# Patient Record
Sex: FEMALE | Race: WHITE
[De-identification: names, ages, dates, MRNs, and addresses within clinical notes are randomized per-mention and may not be internally consistent; named-entity substitution may affect disease eponyms.]

---

## 2018-05-09 ENCOUNTER — HOSPITAL ENCOUNTER (INPATIENT)
Dept: HOSPITAL 25 - ED | Age: 38
LOS: 1 days | Discharge: HOME | DRG: 864 | End: 2018-05-10
Attending: HOSPITALIST | Admitting: HOSPITALIST
Payer: COMMERCIAL

## 2018-05-09 DIAGNOSIS — N39.0: ICD-10-CM

## 2018-05-09 DIAGNOSIS — K50.90: ICD-10-CM

## 2018-05-09 DIAGNOSIS — R50.83: Primary | ICD-10-CM
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
## 2018-05-09 DIAGNOSIS — Z72.89: ICD-10-CM

## 2018-05-09 DIAGNOSIS — G44.40: ICD-10-CM

## 2018-05-09 DIAGNOSIS — L27.0: ICD-10-CM

## 2018-05-09 DIAGNOSIS — J30.2: ICD-10-CM

## 2018-05-09 DIAGNOSIS — M43.6: ICD-10-CM

## 2018-05-09 DIAGNOSIS — Z90.49: ICD-10-CM

## 2018-05-09 DIAGNOSIS — T50.Z95A: ICD-10-CM

## 2018-05-09 DIAGNOSIS — Z88.0: ICD-10-CM

## 2018-05-09 DIAGNOSIS — Z93.2: ICD-10-CM

## 2018-05-09 LAB
BASOPHILS # BLD AUTO: 0.1 10^3/UL (ref 0–0.2)
EOSINOPHIL # BLD AUTO: 0 10^3/UL (ref 0–0.6)
HCT VFR BLD AUTO: 37 % (ref 35–47)
HGB BLD-MCNC: 12.3 G/DL (ref 12–16)
INR PPP/BLD: 1.37 (ref 0.77–1.02)
LYMPHOCYTES # BLD AUTO: 1.4 10^3/UL (ref 1–4.8)
MCH RBC QN AUTO: 29 PG (ref 27–31)
MCHC RBC AUTO-ENTMCNC: 34 G/DL (ref 31–36)
MCV RBC AUTO: 86 FL (ref 80–97)
MONOCYTES # BLD AUTO: 1.2 10^3/UL (ref 0–0.8)
NEUTROPHILS # BLD AUTO: 16.5 10^3/UL (ref 1.5–7.7)
NRBC # BLD AUTO: 0 10^3/UL
NRBC BLD QL AUTO: 0.1
PLATELET # BLD AUTO: 269 10^3/UL (ref 150–450)
RBC # BLD AUTO: 4.24 10^6/UL (ref 4–5.4)
WBC # BLD AUTO: 19.2 10^3/UL (ref 3.5–10.8)

## 2018-05-09 PROCEDURE — 99284 EMERGENCY DEPT VISIT MOD MDM: CPT

## 2018-05-09 PROCEDURE — 87186 SC STD MICRODIL/AGAR DIL: CPT

## 2018-05-09 PROCEDURE — 81015 MICROSCOPIC EXAM OF URINE: CPT

## 2018-05-09 PROCEDURE — 86140 C-REACTIVE PROTEIN: CPT

## 2018-05-09 PROCEDURE — 80053 COMPREHEN METABOLIC PANEL: CPT

## 2018-05-09 PROCEDURE — 009U3ZX DRAINAGE OF SPINAL CANAL, PERCUTANEOUS APPROACH, DIAGNOSTIC: ICD-10-PCS | Performed by: HOSPITALIST

## 2018-05-09 PROCEDURE — 87070 CULTURE OTHR SPECIMN AEROBIC: CPT

## 2018-05-09 PROCEDURE — 82945 GLUCOSE OTHER FLUID: CPT

## 2018-05-09 PROCEDURE — 84157 ASSAY OF PROTEIN OTHER: CPT

## 2018-05-09 PROCEDURE — 83735 ASSAY OF MAGNESIUM: CPT

## 2018-05-09 PROCEDURE — 36415 COLL VENOUS BLD VENIPUNCTURE: CPT

## 2018-05-09 PROCEDURE — 84484 ASSAY OF TROPONIN QUANT: CPT

## 2018-05-09 PROCEDURE — 85652 RBC SED RATE AUTOMATED: CPT

## 2018-05-09 PROCEDURE — 87077 CULTURE AEROBIC IDENTIFY: CPT

## 2018-05-09 PROCEDURE — 84702 CHORIONIC GONADOTROPIN TEST: CPT

## 2018-05-09 PROCEDURE — 89051 BODY FLUID CELL COUNT: CPT

## 2018-05-09 PROCEDURE — 87205 SMEAR GRAM STAIN: CPT

## 2018-05-09 PROCEDURE — 71045 X-RAY EXAM CHEST 1 VIEW: CPT

## 2018-05-09 PROCEDURE — 87086 URINE CULTURE/COLONY COUNT: CPT

## 2018-05-09 PROCEDURE — 87040 BLOOD CULTURE FOR BACTERIA: CPT

## 2018-05-09 PROCEDURE — 85025 COMPLETE CBC W/AUTO DIFF WBC: CPT

## 2018-05-09 PROCEDURE — 80048 BASIC METABOLIC PNL TOTAL CA: CPT

## 2018-05-09 PROCEDURE — 83605 ASSAY OF LACTIC ACID: CPT

## 2018-05-09 PROCEDURE — 85610 PROTHROMBIN TIME: CPT

## 2018-05-09 PROCEDURE — 81003 URINALYSIS AUTO W/O SCOPE: CPT

## 2018-05-09 RX ADMIN — SODIUM CHLORIDE ONE MLS/HR: 900 IRRIGANT IRRIGATION at 18:23

## 2018-05-09 NOTE — XMS REPORT
Heide Romero

 Created on:May 7, 2018



Patient:Heide Romero

Sex:Female

:1980

External Reference #:[id number]





Demographics







 Address  [address]



   [place] [address]

 

 Home Phone  [phone]

 

 Email Address  [email or web address]

 

 Preferred Language  English

 

 Marital Status  Not  Or 

 

 [employer] Affiliation  Unknown

 

 Race  White

 

 Ethnic Group  Not  Or 









Author







 Organization  [facility] [employer]

 

 Address  [address] [address]

 

 Phone  [phone]









Support







 Name  Relationship  Address  Phone

 

 Crescencio Acosta  Unavailable  Unavailable  [phone]









Care Team Providers







 Name  Role  Phone

 

 Ira Araujo MD  Primary Care Physician  Unavailable









Payers







 Type  Date  Identification Numbers  Payment Provider  Subscriber

 

 Commercial  Effective:  Policy Number: [id number]  [employer]  Heide Romero



   2011      









 PayID: [id number]  [id number]









 [place] [address]









 Medigap Part B  Expires:  Policy Number:  [employer]  Heide Romero



   2011  [id number]    









 PayID: [id number]  [id number]









 [place] [address]







Problems







 Date  Description  Provider  Status

 

 Onset: 2012  Contact dermatitis  Lalitha Kingston M.D.  Active

 

 Onset: 2017  Ileostomy present  Ira Araujo M.D.  Active

 

 Onset: 2017  Crohn's disease  Ira Araujo M.D.  Active

 

 Onset: 2017  Human papilloma virus infection  Ira Araujo M.D.  
Resolved









 Resolved: 2018







Social History







 Type  Date  Description  Comments

 

 Smoking    Patient has never smoked  

 

 General Hx Text       Cornwll Un prof (history ) cig



       no etoh 1-2 x per week  no illegal



       drugs exercise walk caffiene 1 cup  Dr Yvette Rutherford







Allergies, Adverse Reactions, Alerts







 Date  Description  Reaction  Status  Severity  Comments

 

 2011  Amoxicillin  rash  active    







Medications







 Medication  Date  Status  Form  Strength  Qnty  SIG  Indications  Ordering



                 Provider

 

 Fluocinonide-E  /  Active  Cream  0.05%  30gm  apply a thin    Ira



             film of the    Van



             cream to    M.D.



             affected    



             area for 1    



             week then    



             prn    

 

 Imitrex  /  Active  Tablets  100mg  7tabs  1 tab at  G43.901  Ira



             onset of jimenez    Araujo,



             and may    M.D.



             repeat in 2    



             hr. max    



             200mg per    



             24hr    

 

 Remicade  /  Active  Solution  100mg    infusion    Krish,



   2012    Rec      every 8-10    MD Dinesh



             weeks    

 

 Qnasl  /  Active  Aerosol  80mcg/Act  8.700  2    Unknown



   0000        gm  inhalations    



             in each    



             nostril once    



             daily prn    

 

 Ventolin HFA  /  Active  Aerosol  108(90Base  1unit  2 puffs po    Unknown



   0000      ) mcg/Act  s  qid prn    

 

 Olopatadine  /  Active  Solution  0.6%    instill 1    Unknown



 HCL  0000          spray into    



             each nostril    



             every    



             evening prn    

 

 Zyrtec Allergy  /  Active  Tablets  10mg    1 by mouth    Unknown



   0000          every day as    



             needed for    



             allergies    

 

 Apri  /  Active  Tablets  0.15-30mg-  28tab  1 by mouth    Ira



   0000      mcg  s  every day    JOYCE Araujo

 

 Multi Vitamin  00/  Active  Tablets      1 by mouth    Unknown



 Daily  0000          every day    

 

 Zinc  /  Active  Tablets  50mg    0nce daily    Unknown



   0000              

 

 Vitamin C  /  Active  Capsules      1 by mouth    Unknown



   0000          every day    

 

                 

 

 Azithromycin  /  Hx  Tablets  250mg  6tabs  2 tab by    Lalitha



   2016 -          mouth day 1    Thee



   /          then 1 tab    M.D.



             by mouth day    



             2-5    

 

 Keflex  /  Hx  Capsules  250mg  30cap  1 tab by    Lalitha



   2016 -        s  mouth every    Thee,



   /          8 hours    M.D.



                 

 

 Clotrimazole/B  /  Hx  Cream  1-0.05%  30gm  apply twice  691.8  Lalitha



 etamethasone   -          daily as    Thee



 Dipropionate  /          needed    M.D.



                 

 

 Gentamicin  /  Hx  Ointment  0.1%  1tube  /14 in to  692.9  Lalitha



 Sulfate   -          each eye 4x    Thee



   /          per day 7    M.D.



             days    

 

 Erythromycin  /  Hx  Ointment  5mg/GM  1tube  apply to lt  373.00  Lalitha



   2011 -          eye  in    [place],



   /          3x per day    M.D.



             7-10 days    

 

 Cipro  /  Hx  Tablets  250mg  20tab  one by woody  373.00  Lalitha



    -        s  twice daily    [place],



             for 10 days    M.D.



                 

 

 Remicade  /  Hx  Solution  300mg    q 8 weeks    Unknown



   0000 -    Rec          



   2016              

 

 Cephalexin  /  Hx  Capsules  250mg  30cap  take 1  692.9  Unknown



   0000 -        s  capsule by    



   /          mouth three    



   2012          times a day    



             for 10 days    

 

 Hydroxyzine  /  Hx  Tablets  25mg        Unknown



 HCL  0000 -              



   2017              







Immunizations







 CPT Code  Status  Date  Vaccine  Reaction  Lot #

 

 [id number]  Given  2018  Pneumonia Vaccine    [id number]

 

 45770  Given  2017  Tdap - Tetanus/Diptheria/Acellular    [id number]



       Pertussis    

 

   Given  2015  Fluvirin Im 3Yrs And Older    

 

   Given  2014  Fluvirin Im 3Yrs And Older  [facility]  

 

   Given  2014  Fluvirin Im 3Yrs And Older    [id number]

 

 71264  Given  2011  Influenza Virus 3Yrs &amp; Over    [id number]







Vital Signs







 Date  Vital  Result  Comment

 

 2018  Height  61.4 inches  5'1.40"









 Weight  134.00 lb  

 

 Heart Rate  67 /min  

 

 BP Systolic Sitting  138 mmHg  

 

 BP Diastolic Sitting  80 mmHg  

 

 O2 % BldC Oximetry  98 %  

 

 BMI (Body Mass Index)  25.0 kg/m2  









 2017  Height  61 inches  5'1"









 Weight  133.00 lb  

 

 Heart Rate  92 /min  

 

 BP Systolic Sitting  126 mmHg  

 

 BP Diastolic Sitting  80 mmHg  

 

 Respiratory Rate  15 /min  

 

 Body Temperature  98.0 F  

 

 O2 % BldC Oximetry  98 %  

 

 BMI (Body Mass Index)  25.1 kg/m2  









 2016  Weight  130.00 lb  









 Heart Rate  95 /min  

 

 BP Systolic Sitting  108 mmHg  

 

 BP Diastolic Sitting  70 mmHg  

 

 Body Temperature  98.3 F  

 

 O2 % BldC Oximetry  99 %  









 2016  Height  61 inches  5'1"









 Weight  131.00 lb  

 

 Heart Rate  102 /min  

 

 BP Systolic Sitting  144 mmHg  

 

 BP Diastolic Sitting  83 mmHg  

 

 Body Temperature  98.5 F  

 

 BMI (Body Mass Index)  24.7 kg/m2  









 2015  Height  61 inches  5'1"









 Weight  131.00 lb  

 

 Heart Rate  95 /min  

 

 BP Systolic Sitting  138 mmHg  

 

 BP Diastolic Sitting  64 mmHg  

 

 O2 % BldC Oximetry  97 %  

 

 BMI (Body Mass Index)  24.7 kg/m2  









 2014  Height  61 inches  5'1"









 Weight  124.00 lb  

 

 Heart Rate  73 /min  

 

 BP Systolic Sitting  116 mmHg  

 

 BP Diastolic Sitting  68 mmHg  

 

 O2 % BldC Oximetry  99 %  

 

 BMI (Body Mass Index)  23.4 kg/m2  









 2012  Height  62 inches  5'2"









 Weight  124.00 lb  

 

 Heart Rate  78 /min  

 

 BP Systolic Sitting  124 mmHg  

 

 BP Diastolic Sitting  80 mmHg  

 

 BMI (Body Mass Index)  22.7 kg/m2  









 2012  Height  62 inches  5'2"









 Weight  124.00 lb  

 

 Heart Rate  80 /min  

 

 BP Systolic Sitting  140 mmHg  l

 

 BP Diastolic Sitting  82 mmHg  l

 

 BMI (Body Mass Index)  22.7 kg/m2  









 2011  Weight  125.00 lb  









 Heart Rate  72 /min  

 

 BP Systolic Sitting  118 mmHg  

 

 BP Diastolic Sitting  80 mmHg  









 2011  Height  61 inches  5'1"









 Weight  127.00 lb  

 

 Heart Rate  74 /min  

 

 BP Systolic Sitting  110 mmHg  

 

 BP Diastolic Sitting  74 mmHg  

 

 BMI (Body Mass Index)  24.0 kg/m2  







Results







 Test  Date  Test  Result  H/L  Range  Note

 

 Laboratory test finding  2018  Cytology  &lt;pending&gt;      

 

 Lipid Profile  2018  Triglycerides  129 mg/dL      1



 (Trig/Chol/HDL)            









 Cholesterol  171 mg/dL      2

 

 HDL Cholesterol  73.2 mg/dL      3

 

 LDL Cholesterol  72 mg/dL      4









 Laboratory test finding  2018  Glucose  80 mg/dL      5

 

 Laboratory test finding  2017  Cytology  SEE RESULT BELOW      6









 HPV Rna Ww/Reflex Genotype  Negative    Negative  7









 Lipid Profile (Trig/Chol/HDL)  2017  Triglycerides  130 mg/dL      8









 Cholesterol  156 mg/dL      9

 

 HDL Cholesterol  72.9 mg/dL      10

 

 LDL Cholesterol  57 mg/dL      11









 CBC Auto Diff  2017  White Blood Count  9.1 10^3/uL    3.5-10.8  









 Red Blood Count  4.62 10^6/uL    4.0-5.4  

 

 Hemoglobin  13.7 g/dL    12.0-16.0  

 

 Hematocrit  41 %    35-47  

 

 Mean Corpuscular Volume  88 fL    80-97  

 

 Mean Corpuscular Hemoglobin  30 pg    27-31  

 

 Mean Corpuscular HGB Conc  34 g/dL    31-36  

 

 Red Cell Distribution Width  13 %    10.5-15  

 

 Platelet Count  346 10^3/uL    150-450  

 

 Mean Platelet Volume  8 um3    7.4-10.4  

 

 Abs Neutrophils  4.6 10^3/uL    1.5-7.7  

 

 Abs Lymphocytes  3.4 10^3/uL    1.0-4.8  

 

 Abs Monocytes  0.9 10^3/uL  High  0-0.8  

 

 Abs Eosinophils  0.1 10^3/uL    0-0.6  

 

 Abs Basophils  0 10^3/uL    0-0.2  

 

 Abs Nucleated RBC  0.01 10^3/uL      

 

 Granulocyte %  51.0 %    38-83  

 

 Lymphocyte %  37.0 %    25-47  

 

 Monocyte %  10.3 %  High  1-9  

 

 Eosinophil %  1.4 %    0-6  

 

 Basophil %  0.3 %    0-2  

 

 Nucleated Red Blood Cells %  0.1      









 Comp Metabolic Panel  2017  Sodium  135 mmol/L    133-145  









 Potassium  4.2 mmol/L    3.5-5.0  

 

 Chloride  102 mmol/L    101-111  

 

 Co2 Carbon Dioxide  27 mmol/L    22-32  

 

 Anion Gap  6 mmol/L    2-11  

 

 Glucose  79 mg/dL      

 

 Blood Urea Nitrogen  11 mg/dL    6-24  

 

 Creatinine  0.78 mg/dL    0.51-0.95  

 

 BUN/Creatinine Ratio  14.1    8-20  

 

 Calcium  9.4 mg/dL    8.6-10.3  

 

 Total Protein  7.3 g/dL    6.4-8.9  

 

 Albumin  4.0 g/dL    3.2-5.2  

 

 Globulin  3.3 g/dL    2-4  

 

 Albumin/Globulin Ratio  1.2    1-3  

 

 Total Bilirubin  0.30 mg/dL    0.2-1.0  

 

 Alkaline Phosphatase  63 U/L      

 

 Alt  8 U/L    7-52  

 

 Ast  15 U/L    13-39  

 

 Egfr Non-  82.7    &gt;60  

 

 Egfr   106.3    &gt;60  12









 Laboratory test finding  2016  Culture Throat  SEE RESULT BELOW      13

 

 Laboratory test finding  2016  Rapid Group A Strep  neg      

 

 Lipid Profile (Trig/Chol/HDL)  2016  Triglycerides  115 mg/dL      14









 Cholesterol  174 mg/dL      15

 

 HDL Cholesterol  78.4 mg/dL      16

 

 LDL Cholesterol  73 mg/dL      17









 Laboratory test  2016  Glucose  69 mg/dL  Low    



 finding            

 

 Laboratory test  2015  Cytology  RUN DATE:      18



 finding      / &lt;SEE      



       NOTE&gt;      

 

 Laboratory test  2015  Human Papilloma Virus  Positive    Negative  19



 finding    Rna        

 

 Lipid Profile  2015  Triglycerides  99 mg/dL      20, 21



 (Trig/Chol/HDL)            









 Cholesterol  175 mg/dL      20, 22

 

 HDL Cholesterol  75.5 mg/dL      20, 23

 

 LDL Cholesterol  80 mg/dL      20, 24









 Laboratory test  2015  Glucose  82 mg/dL      20, 25



 finding            

 

 Surgical Pathology  07/10/2014  S  RUN DATE:      26



       / &lt;SEE      



       NOTE&gt;      

 

 Laboratory test  2014  Cytology  RUN DATE:      27



 finding      / &lt;SEE      



       NOTE&gt;      

 

 Laboratory test  2014  Affirm Vaginal Dna  (SEE NOTE)      28



 finding    Probe        

 

 Lipid Profile  2014  Triglycerides  109 mg/dL      



 (Trig/Chol/HDL)            









 Cholesterol  187 mg/dL    Less than 200  

 

 HDL Cholesterol  99 mg/dL  High  40-60  29

 

 Cholesterol/HDL Ratio  1.9 Average    1-4.44  

 

 LDL Cholesterol  66.2    Less Than 100  30









 Laboratory test finding  2014  Glucose  74 mg/dL      31

 

 Laboratory test finding  2012  Cytology  RUN DATE: 



       &lt;SEE NOTE&gt;      

 

 Laboratory test finding  2012  Glucose  77 mg/dL      33

 

 Lipid Profile  2012  Triglycerides  88 mg/dL      



 (Trig/Chol/HDL)            









 Cholesterol  188 mg/dL    Less than 200  

 

 HDL Cholesterol  85 mg/dL  High  40-60  34

 

 Cholesterol/HDL Ratio  2.2 AVERAGE    1-4.44  

 

 LDL Cholesterol  85.4 mg/dL    Less Than 100  35









 Surgical Pathology  2011  Surgical Pathology  ---------------- &lt;SEE  
    36



       NOTE&gt;      

 

 Laboratory test  2011  Cytology  ---------------- &lt;SEE      37



 finding      NOTE&gt;      









 Chlamydia Trachomatis Rna  N      38

 

 GC (N. Gonorrhoeae) Rna  N      39

 

 Bacterial Vaginosis Smear  Bacterial Vagino &lt;SEE NOTE&gt;      40









 Lipid Profile (Trig/Chol/HDL)  2011  Triglyceride  105 mg/dL      









 Cholesterol  177 mg/dL    Less Than 200  41

 

 High Density Lipoprotein  67 mg/dL  High  40-60  42

 

 Cholesterol/HDL Ratio  2.64 AVERAGE    1-4.44  

 

 Low Density Lipoprotein  89 mg/dL    Less Than 100  43









 Laboratory test finding  2011  Glucose  76 mg/dL      









 1  Desirable: &lt;150



   Borderline High: 150-199



   High: 200-499



   Very High: &gt;500

 

 2  Desirable: &lt;200



   Borderline High: 200-239



   High: &gt;239

 

 3  Low: &lt;40



   Desirable: 40-60



   High: &gt;60

 

 4  Desirable: &lt;100



   Near Optimal: 100-129



   Borderline High: 130-159



   High: 160-189



   Very High: &gt;189

 

 5  FASTING 10 HOUR

 

 6  SEE RESULT BELOW



   -----------------------------------------------------------------------------
---------------



   Name:  HEIDE ROMERO                 : 1980    Attend Dr: Ira Araujo MD



   Acct:  [id number]  Unit: [id number]  AGE: 36            Location:  [facility]



   Re17                        SEX: F             Status:    REG REF



   -----------------------------------------------------------------------------
---------------



   



   SPEC: [id number]            ROHITH: 17-      SUBM DR: Ira Araujo MD



   REQ:  [id number]             RECD: 



   STATUS: SOUT



   _



   ORDERED:  TP IMAGE ANAL, HPV/Thin Prep, HPV 16/18 GENE



   COMMENTS: [id number]



   



   FINAL DIAGNOSIS



   



   Negative for Intraepithelial lesion or Malignancy



   Reactive cellular changes associated with



   Inflammation (includes typical repair)



   A. Ectocervical/Endocervical



   Specimen Adequacy:



   Satisfactory of evaluation



   Transformation zone component identified



   Patient Information:



   HPV: High risk HPV RNA testing regardless of pap results.



   Actual Specimen Date:         17



   Last Menstrual Date:          17



   Previous Abnormal Pap Smears?:N



   If Yes, enter Diagnosis:      HPV+



   



   -----------------------------------------------------------------------------
---------------



   Date     Time Test            Result   Flag (u) Normal Range



   17 1513 HPV RNA RFLX GE Negative          Negative



   



   The high-risk HPV types detected by the assay include: 16,



   18, 31, 33, 35, 39, 45, 51, 52, 56, 58, 59, 66, and 68.



   -----------------------------------------------------------------------------
---------------



   



   



   Signed __________(signature on file)___________ Joy Delatorre MD  [id number]



   



   This Pap test was evaluated with the assistance of the [employer] Test Imaging 
System. Due to



   cytologic findings at the imager microscope, comprehensive manual 
rescreening by a



   Cytotechnologist may be required.



   The Pap Smear is a screening test designed to aid in the detection of 
premalignant and



   malignant conditions of the uterine cervix.  It is not a diagnostic 
procedure and should



   not be used as the sole means of detecting cervical cancer.  Both false-
positive and false-



   negative reports do occur.  Depending on your risk status, a Pap smear 
should be obtained



   and evaluated every 1-3 years.



   



   -----------------------------------------------------------------------------
---------------



   



   ** END OF REPORT **



   



   * ML=Testing performed at Main Lab



   DEPARTMENT OF PATHOLOGY,  [address] [place] [address]



   Phone # [phone]      Fax #[phone]



   Nathaniel Mishra M.D. Director     [facility] # [id number]

 

 7  The high-risk HPV types detected by the assay include: 16,



   18, 31, 33, 35, 39, 45, 51, 52, 56, 58, 59, 66, and 68.

 

 8  Desirable &lt;150



   Borderline high 150-199



   High 200-499



   Very High &gt;500

 

 9  Desirable &lt;200



   Borderline high 200-239



   High &gt;239

 

 10  Low &lt;40



   Desirable: 40-60



   High: &gt;60

 

 11  Desirable: &lt;100 mg/dL



   Near Optimal: 100-129 mg/dL



   Borderline High: 130-159 mg/dL



   High: 160-189 mg/dL



   Very High: &gt;189 mg/dL

 

 12  *******Because ethnic data is not always readily available,



   this report includes an eGFR for both -Americans and



   non- Americans.****



   The National Kidney Disease Education Program (NKDEP) does



   not endorse the use of the MDRD equation for patients that



   are not between the ages of 18 and 70, are pregnant, have



   extremes of body size, muscle mass, or nutritional status,



   or are non- or non-.



   According to the National Kidney Foundation, irrespective of



   diagnosis, the stage of the disease is based on the level of



   kidney function:



   Stage Description                      GFR(mL/min/1.73 m(2))



   1     Kidney damage with normal or decreased GFR       90



   2     Kidney damage with mild decrease in GFR          60-89



   3     Moderate decrease in GFR                         30-59



   4     Severe decrease in GFR                           15-29



   5     Kidney failure                       &lt;15 (or dialysis)

 

 13  SEE RESULT BELOW



   -----------------------------------------------------------------------------
---------------



   Name:  HEIDE ROMERO                 : 1980    Attend Dr: Lalitha Kingston MD



   Acct:  [id number]  Unit: [id number]  AGE: 35            Location:  [facility]



   Re16                        SEX: F             Status:    REG REF



   -----------------------------------------------------------------------------
---------------



   



   SPEC: [id number]         ROHITH:       SUBM DR: Lalitha Kingston MD



   REQ:  [id number]              RECD:   



   STATUS: COMP



   _



   SOURCE: THROAT         SPDESC:



   ORDERED:  Throat Culture



   COMMENTS: [facility] [id number]



   



   -----------------------------------------------------------------------------
---------------



   Procedure                         Result                         Reported   
        Site



   -----------------------------------------------------------------------------
---------------



   Throat Culture  Final                                            16-
1250      ML



   



   Organism 1                     NORMAL CRISPIN



   Quantity                    3+



   



   Throat cultures are clinically indicated to detect the



   presence of group A strep, arcanobacterium and yeast. In



   certain cases, predominating organisms will be reported.



   



   -----------------------------------------------------------------------------
---------------



   * ML - MAIN LAB ([facility])



   .



   



   



   



   



   



   



   



   



   



   



   



   



   



   



   



   



   



   



   



   ** END OF REPORT **



   



   * ML=Testing performed at Main Lab



   DEPARTMENT OF PATHOLOGY,  [address] [place] [address]



   Phone # [phone]      Fax #[phone]



   Nathaniel Mishra M.D. Director     [facility] # [id number]

 

 14  Desirable &lt;150



   Borderline high 150-199



   High 200-499



   Very High &gt;500

 

 15  Desirable &lt;200



   Borderline high 200-239



   High &gt;239

 

 16  Low &lt;40



   Desirable: 40-60



   High: &gt;60

 

 17  Desirable: &lt;100 mg/dL



   Near Optimal: 100-129 mg/dL



   Borderline High: 130-159 mg/dL



   High: 160-189 mg/dL



   Very High: &gt;189 mg/dL

 

 18  RUN DATE: 01/23/15               [facility] **LIVE**       
         PAGE    1



   RUN TIME: 1535             [address] [place] [address]   [address]



   Specimen Inquiry



   



   -----------------------------------------------------------------------------
---------------



   Name:  HEIDE ROMERO                 : 1980    Attend Dr: Lalitha Kingston MD



   Acct:  [id number]  Unit: [id number]  AGE: 34            Location:  [facility]



   Re/22/15                        SEX: F             Status:    REG REF



   -----------------------------------------------------------------------------
---------------



   



   SPEC: [id number]             ROHITH: 01/22/      [facility] DR: Lalitha Kingston MD



   REQ:  [id number]             RECD: 01/22/



   STATUS: SOUT



   _



   ORDERED:  IMAGE ANALYSIS, HPV/Thin Prep



   



   FINAL DIAGNOSIS



   



   Negative for Intraepithelial lesion or Malignancy



   A. Ectocervical/Endocervical



   Specimen Adequacy:



   Satisfactory of evaluation



   Transformation zone component identified



   Patient Information:



   HPV: High risk HPV RNA testing regardless of pap results.



   Actual Specimen Date:         01/22/15



   Other Pertinent History:     No History Given



   



   -----------------------------------------------------------------------------
---------------



   Date     Time Test            Result   Flag (u) Normal Range



   01/22/15 0938 HPV RNA         Positive  H       Negative



   



   The high-risk HPV types detected by the assay include: 16,



   18, 31, 33, 35, 39, 45, 51, 52, 56, 58, 59, 66, and 68.



   -----------------------------------------------------------------------------
---------------



   



   



   Signed __________(signature on file)___________ COURTNEY Cedillo(ASCP) 01/23/
15 5905



   



   This Pap test was evaluated with the assistance of the ThinPrep Test Imaging 
System. Due to



   cytologic findings at the imager microscope, comprehensive manual 
rescreening by a



   Cytotechnologist may be required.



   The Pap Smear is a screening test designed to aid in the detection of 
premalignant and



   malignant conditions of the uterine cervix.  It is not a diagnostic 
procedure and should



   not be used as the sole means of detecting cervical cancer.  Both false-
positive and false-



   negative reports do occur.  Depending on your risk status, a Pap smear 
should be obtained



   and evaluated every 1-3 years.



   



   -----------------------------------------------------------------------------
---------------



   



   



   



   ** END OF REPORT **



   



   * ML=Testing performed at Main Lab



   DEPARTMENT OF PATHOLOGY,  [facility] [employer] [place], [place] [address]



   Phone # [phone]      Fax #[phone]



   Nathaniel Mishra M.D. Director     [facility] # [id number]

 

 19  The high-risk HPV types detected by the assay include: 16,



   18, 31, 33, 35, 39, 45, 51, 52, 56, 58, 59, 66, and 68.

 

 20  FASTING 12 HOUR

 

 21  Desirable &lt;150



   Borderline high 150-199



   High 200-499



   Very High &gt;500

 

 22  Desirable &lt;200



   Borderline high 200-239



   High &gt;239

 

 23  Low &lt;40



   Desirable: 40-60



   High: &gt;60

 

 24  Desirable &lt;100



   Near Optimal 100-129



   Borderline high 130-159



   High 160-189



   Very High &gt;189

 

 25  FASTING 12 HOUR

 

 26  RUN DATE: 14               [facility] **LIVE**       
         PAGE    1



   RUN TIME: 3665             [facility] [place], [place]   [address]



   Specimen Inquiry



   



   -----------------------------------------------------------------------------
---------------



   Name:  HEIDE ROMERO                 : 1980    Attend Dr: Dinesh Rtuherford MD



   Acct:  [id number]  Unit: [id number]  AGE: 34            Location:  [facility]



   Re/10/14                        SEX: F             Status:    REG REF



   -----------------------------------------------------------------------------
---------------



   



   SPEC: [id number]             ROHITH: 07/10/14-00      SUBM DR: Dinesh Rutherford MD



   REQ:  [id number]             RECD: 07/10/



   STATUS: EDDIE GAVIRIA DR: Lalitha Kingston MD



   _



   ORDERED:  LEVEL IV/2



   



   FINAL DIAGNOSIS



   



   1. Colon, at 20 cm., biopsies:



   A. Chronic focally active colitis.



   B. No evidence of dysplasia.



   



   2. Colon, at 10 cm., biopsies:



   A. Chronic focally active colitis.



   B. No evidence of dysplasia.



   



   



   



   



   COMMENTS:



   



   Histologic sections from both specimens show colonic mucosa



   with mild crypt architecture distortion and an increase in



   lamina propria infiltrate cellularity. Focally acute cryptitis



   is present. No granulomata are seen. There is no evidence of



   dysplasia. The findings are compatible with an inflammatory



   bowel disease such as Crohn's disease or ulcerative colitis.



   Clinicopathologic correlation is recommended.



   



   



   



   CLINICAL HISTORY



   



   Screening colonoscopy - Crohn's disease, last flex sigmoidoscopy 



   



   POST-OPERATIVE DIAGNOSIS



   



   Screening colonoscopy to 20 cm. - biopsy at 10 cm. and 20 cm.



   



   



   



   



   



   ** CONTINUED ON NEXT PAGE **



   



   * ML=Testing performed at Main Lab



   DEPARTMENT OF PATHOLOGY,  [facility] [employer] [place], [place] [address]



   Phone # [phone]      Fax #[phone]



   Nathaniel Mishra M.D. Director     [facility] # [id number]



   



   



   



   RUN DATE: 14               [facility] **LIVE**         
       PAGE    2



   RUN TIME: 7919             [address] [place] [address]   [address]



   Specimen Inquiry



   



   -----------------------------------------------------------------------------
---------------



   Patient: HEIDE ROMERO                  [id number]     (Continued)



   -----------------------------------------------------------------------------
---------------



   



   GROSS DESCRIPTION          (Continued)



   



   



   GROSS DESCRIPTION



   



   1. The specimen is received in formalin labeled Heide PERSONCeline Nick, Colon 
Biopsies at 20 cm.



   and consists of a 0.6 x 0.5 x 0.2 cm. aggregate of multiple tan irregular 
soft tissue



   fragments. Submitted entirely, one cassette.



   



   2. The specimen is received in formalin labeled Heide NAYACeline Nick, Colon 
Biopsies at 10 cm.



   and consists of a 0.7 x 0.6 x 0.2 cm. aggregate of multiple tan-pink 
irregular soft tissue



   fragments. Submitted entirely, one cassette.



   



   Signed __________(signature on file)___________ Joy Delatorre MD  [id number]



   



   -----------------------------------------------------------------------------
---------------



   



   



   



   



   



   



   



   



   



   



   



   



   



   



   



   



   



   



   



   



   



   



   



   



   



   



   



   



   



   ** END OF REPORT **



   



   * ML=Testing performed at Main Lab



   DEPARTMENT OF PATHOLOGY,  [address] [place] [address]



   Phone # [phone]      Fax #[phone]



   Nathaniel Mishra M.D. Director     [facility] # [id number]

 

 27  RUN DATE: 14               [facility] **LIVE**       
         PAGE    1



   RUN TIME: 1222             [address] [place], [place]   [address]



   Specimen Inquiry



   



   -----------------------------------------------------------------------------
---------------



   Name:  HEIDE ROMERO                 : 1980    Attend Dr: Lalitha Kingston MD



   Acct:  [id number]  Unit: [id number]  AGE: 33            Location:  [facility]



   Re14                        SEX: F             Status:    REG REF



   -----------------------------------------------------------------------------
---------------



   



   SPEC: [id number]             ROHITH:       [facility] DR: Lalitha Kingston MD



   REQ:  [id number]             RECD: 



   STATUS: SOUT



   _



   ORDERED:  IMAGE ANALYSIS



   



   FINAL DIAGNOSIS



   



   Negative for Intraepithelial lesion or Malignancy



   A. Ectocervical/Endocervical



   Specimen Adequacy:



   Satisfactory of evaluation



   Transformation zone component identified



   Patient Information:



   HPV: Thin Layer Pap Test w/reflex to high risk HPV DNA testing when ASCUS



   Actual Specimen Date:         14



   Last Menstrual Date:          14



   Cautery:       N



   IUD:           N



   Lesion, grossly demonstrate:  N



   Pregnant?:     N



   Post Menopausal?:             N



   Hysterectomy?:                N



   Previous Abnormal Pap Smears?:N



   



   Signed __________(signature on file)___________ COURTNEY Newman (ASC) 
14 1221



   



   This Pap test was evaluated with the assistance of the [employer] Test Imaging 
System. Due to



   cytologic findings at the imager microscope, comprehensive manual 
rescreening by a



   Cytotechnologist may be required.



   The Pap Smear is a screening test designed to aid in the detection of 
premalignant and



   malignant conditions of the uterine cervix.  It is not a diagnostic 
procedure and should



   not be used as the sole means of detecting cervical cancer.  Both false-
positive and false-



   negative reports do occur.  Depending on your risk status, a Pap smear 
shoudl be obtained



   and evaluated every 1-3 years.



   



   -----------------------------------------------------------------------------
---------------



   



   



   



   



   



   ** END OF REPORT **



   



   * ML=Testing performed at Main Lab



   DEPARTMENT OF PATHOLOGY,  [facility] [employer] [place] [address]



   Phone # [phone]      Fax #[phone]



   Nathaniel Mishra M.D. Director     [place] Permit  #[id number]

 

 28  RUN DATE: 14               [facility] **LIVE**       
         PAGE    1



   RUN TIME: 1140             [facility] [employer] [place], [place]   [address]



   Specimen Inquiry



   



   -----------------------------------------------------------------------------
---------------



   Name:  HEIDE ROMERO                 : 1980    Attend Dr: Lalitha Kingston MD



   Acct:  [id number]  Unit: [id number]  AGE: 33            Location:  [facility]



   Re14                        SEX: F             Status:    REG REF



   -----------------------------------------------------------------------------
---------------



   



   SPEC: [id number]         ROHITH:       KEYSHA DR: Lalitha Kingston MD



   REQ:  [id number]              RECD:   



   STATUS: COMP



   _



   SOURCE: VAGINAL        SPDESC:



   ORDERED:  Affirm



   QUERIES:  [employer] Number [id number]



   



   -----------------------------------------------------------------------------
---------------



   Procedure                         Result                         Verified   
        Site



   -----------------------------------------------------------------------------
---------------



   Affirm Vaginal DNA Probe  Final                                  14-
1140      ML



   



   Organism 1                     Negative Trichomonas



   Organism 2                     Negative Gardnerella



   Organism 3                     Negative Candida



   



   The presence of G. vaginalis, although suggestive, is not



   diagnostic for bacterial vaginosis. Results should be



   interpreted in conjunction with other clinical and



   laboratory data available.



   



   Women with vaginal discharge should be evaluated for risk



   factors of cervicitis and pelvic inflammatory disease, toxic



   shock syndrome (S.aureus), and if present, evaluated for



   organisms not included in this assay such as N. gonorrhoeae,



   C. trachomatis, Mobiluncus, Mycoplasma and/or Prevotella.



   Mixed infections may occur.



   



   The performance of this test on patient specimens collected



   during or immediately after antimicrobial therapy is



   unknown. The presence or absence of Candida species, G.



   vaginalis or T. vaginalis cannot be used as a test for



   therapeutic success or failure.



   



   -----------------------------------------------------------------------------
---------------



   



   



   



   



   



   



   



   ** END OF REPORT **



   



   * ML=Testing performed at Main Lab



   DEPARTMENT OF PATHOLOGY,  [facility] [employer] [place] [address]



   Phone # [phone]      Fax #[phone]



   Nathaniel Mishra M.D. Director     [place] Permit  #[id number]

 

 29  HDL Interpretation:



   Undesirable: High Risk:  Less than 40 mg/dL



   Desirable:  Low Risk:  Greater than 60 mg/dL

 

 30  LDL Interpretation:



   Low Risk Optimal Level:  LDL Less than 100 mg/dL



   Near or Above Optimal:  -129 mg/dL



   Borderline High Risk:  -159 mg/dL



   High Risk:  -189 mg/dL



   Very High Risk:  LDL Greater than 189 mg/dL

 

 31  FASTING

 

 32  RUN DATE: 12               [facility] **LIVE**       
         PAGE    1



   RUN TIME: 1227             [facility] [place] [place], [place]   [address]



   Specimen Inquiry



   



   -----------------------------------------------------------------------------
---------------



   Name:  HEIDE ROMERO                 : 1980    Attend Dr: Lalitha Kingston MD



   Acct:  [id number]  Unit: [id number]  AGE: 32            Location:  [facility]



   Re12                        SEX: F             Status:    REG REF



   -----------------------------------------------------------------------------
---------------



   



   SPEC: [id number]            ROHITH:       [facility] DR: Thee AMAYA,
Lalitha VAIBHAVCeline



   REQ:  [id number]             RECD: 



   STATUS: SOUT



   _



   ORDERED:  IMAGE ANALYSIS



   Negative for Intraepithelial lesion or Malignancy



   A. Ectocervical/Endocervical



   Specimen Adequacy:



   Satisfactory of evaluation



   Transformation zone component identified



   No menstrual history given



   Patient Information:



   HPV: Thin Layer Pap Test w/reflex to high risk HPV DNA testing when ASCUS



   Actual Specimen Date:         12



   LMP If Unknown:               Last Menstrual Period Not Given.



   Lesion, grossly demonstrate:  N



   Previous Abnormal Pap Smears?:N



   



   Signed __________(signature on file)___________ COURTNEY Newman (ASCP) 
12 1227



   



   This Pap test was evaluated with the assistance of the [employer] Test Imaging 
System. Due to



   cytologic findings at the imager microscope, comprehensive manual 
rescreening by a



   Cytotechnologist may be required.



   The Pap Smear is a screening test designed to aid in the detection of 
premalignant and



   malignant conditions of the uterine cervix.  It is not a diagnostic 
procedure and should



   not be used as the sole means of detecting cervical cancer.  Both false-
positive and false-



   negative reports do occur.  Depending on your risk status, a Pap smear 
shoudl be obtained



   and evaluated every 1-3 years.



   



   -----------------------------------------------------------------------------
---------------



   



   



   



   



   



   



   



   



   



   



   



   



   



   ** END OF REPORT **



   



   * ML=Testing performed at Main Lab



   DEPARTMENT OF PATHOLOGY,  [address] [place] [address]



   Phone # [phone]      Fax #[phone]



   Nathaniel Mishra M.D. Director     [place] Permit  #[id number]

 

 33  FASTING

 

 34  HDL Interpretation:



   Undesirable: High Risk:  Less than 40 MG/DL



   Desirable:  Low Risk:  Greater than 60 MG/DL

 

 35  LDL Interpretation:



   Low Risk Optimal Level:  LDL Less than 100 MG/DL



   Near or Above Optimal:  -129 MG/DL



   Borderline High Risk:  -159 MG/DL



   High Risk:  -189 MG/DL



   Very High Risk:  LDL Greater than 189 MG/DL

 

 36  ---------------------------------------------------------------------------
----



   -------------



   



   RUN DATE: 11               [facility] **LIVE**



   PAGE 1



   RUN TIME: 1303                            Specimen Inquiry



   RUN USER: INTERFACE



   -----------------------------------------------------------------------------
--



   -------------



   



   Name: HEIDE ROMERO                   Acct#: [id number]      Status: REG REF



   Re11



   Age/Sex: 31/F                         Unit#: [id number]       Location: [facility]



    : [birth year]



   -----------------------------------------------------------------------------
--



   -------------



   



   



   Specimen: [id number]    SOUT   Spec Date: 11        [facility]ha Dr: Dinesh jett MD



   Spec Type: SURGICAL P          Received:     Copies to:



   Lalitha gamez MD



   



   



   SPECIMEN



   



   1) BIOPSY COLON AT 20 CM  2) BIOPSY COLON AT 10 CM  3) BIOPSY RECTUM



   



   HISTORY



   



   POST-OP DIAGNOSIS:    Biopsies.



   



   



   CLINICAL INFORMATION:    Crohn's disease.



   



   



   



   GROSS DESCRIPTION



   



   1) The specimen is received in formalin labelled Heide [place], Biopsy



   Colon at 20 cm., and consists of two fragments of yellow tissue measuring



   in aggregate 0.4 x 0.4 x 0.2 cm.  Submitted entirely, one cassette



   labelled 1.



   



   2) The specimen is received in formalin labelled Heide Nick, Biopsy



   Colon at 10 cm., and consists of one fragment of yellow tissue measuring



   0.4 x 0.1 x 0.1 cm. Submitted entirely, one cassette labelled 2.



   



   3) The specimen is received in formalin labelled Heide [place], Biopsy



   Rectum, and consists of two fragments of yellow tissue each measuring



   0.3 x 0.1 x 0.1 cm. Submitted entirely, one cassette labelled 3.



   



   ******DIAGNOSIS******



   



   1) Colon at 20 cm., biopsy:



   A.   Chronic active colitis with focal surface ulceration and acute



   cryptitis.



   B.   Granulomas are not seen.



   



   2) Colon at 10 cm., biopsy:



   A.   Chronic active colitis with acute cryptitis and marked reactive



   changes.



   B.   Granulomas are not seen.



   



   3) Rectum, biopsy:



   A.   Chronic active colitis with acute cryptitis and reactive



   changes.



   B.   One poorly formed granulomas seen (see comment).



   -----------------------------------------------------------------------------
--



   -------------



   



   DEPARTMENT OF PATHOLOGY, [address] [place] [address]



   Phone #[phone]   Fax #[phone]   [facility] Permit #[id number]



   010



   Nathaniel Mishra M.D. Director   Cam Carcamo M.D. Assistant Dir



   marco antonio



   -----------------------------------------------------------------------------
--



   -------------



   



   



   -----------------------------------------------------------------------------
--



   -------------



   



   RUN DATE: 11               [facility] N[facility] **LIVE**



   PAGE 2



   RUN TIME: 1303                            Specimen Inquiry



   RUN USER: INTERFACE



   -----------------------------------------------------------------------------
--



   -------------



   



   Name: HEIDE ROMERO                   Acct#: [id number]      Status: REG REF



   Re11



   Age/Sex: 31/F                         Unit#: [id number]       Location: [facility]



    : [birth year]



   --         --



   CONTINUED



   -----------------------------------------------------------------------------
--



   -------------



   



   



   



   COMMENT



   



   The paucity of granulomas dose not exclude the diagnosis of Crohn's



   disease. The inflammatory infiltrate is dense and extends into the



   muscularis mucosa and submucosa, supporting the diagnosis of Crohn's



   disease.



   



   Signed Electronically by: CAM CARCAMO 11 [id number]



   



   -----------------------------------------------------------------------------
--



   -------------



   



   



   



   



   



   



   



   



   



   



   



   



   



   



   



   



   



   



   



   



   



   



   



   



   



   



   



   



   



   



   



   



   



   



   -----------------------------------------------------------------------------
--



   -------------



   



   DEPARTMENT OF PATHOLOGY, [address] [place] [address]



   Phone #[phone]   Fax #[phone]   [facility] Permit #[id number]



   010



   JOYCE Arias M.D. Assistant Dir



   marco antonio



   -----------------------------------------------------------------------------
--



   -------------

 

 37  ---------------------------------------------------------------------------
----



   -------------



   



   RUN DATE: 11               [facility] **LIVE**



   PAGE 1



   RUN TIME: 1116                            Specimen Inquiry



   RUN USER: INTERFACE



   -----------------------------------------------------------------------------
--



   -------------



   



   Name: HEIDE ROMERO                   Acct#: [id number]      Status: REG REF



   Re11



   Age/Sex: 31/F                         Unit#: [id number]       Location: [facility]



    : [birth year]



   -----------------------------------------------------------------------------
--



   -------------



   



   



   Specimen: [id number]   [facility]   Spec Date: 11        [facility] Dr: Lalitha paz MD



   Spec Type: [id number]            Received:  81   Copies to:



   



   



   SOURCE



   



   ECTOCERVICAL/ENDOCERVICAL Thin Prep with Reflex HPV Test



   



   



   



   PATIENT INFORMATION



   



   ACTUAL COLLECTION DATE: 11



   



   



   PREVIOUS ABNORMAL PAP SMEARS No



   



   



   PATIENT HISTORY: Last menstrual period 3 wks ago



   



   



   



   ADEQUACY OF SPECIMEN



   



   Satisfactory for evaluation  *



   



   



   Transformation zone component identified  *



   



   



   



   *******DIAGNOSIS*******



   



   NEGATIVE FOR INTRAEPITHELIAL LESION OR MALIGNANCY  *



   



   



   



   ******************************



   



   This Pap test was evaluated with the assistance of the



   ThinPrep Pap Test Imaging System.



   



   ******************************



   



   The Pap Smear is a screening test designed to aid in the detection of 
premalign



   ant and



   



   malignant conditions of the uterine cervix.  It is not a diagnostic 
procedure a



   nd should



   



   not be used as the sole means of detecting cervical cancer.  Both false-
positiv



   e and



   



   false-negative reports do occur. Depending on your risk status, a Pap smear 
erinn



   uld be



   



   obtained and evaluated every one to three years.



   



   Initial evaluation performed by    JEANNINE IBANEZ(ASCP) 11



   



   



   Final Interpretation electronically signed by: JEANNINE IBANEZ CT([facility]) 11 
1114



   



   



   -----------------------------------------------------------------------------
--



   -------------



   



   



   



   



   



   -----------------------------------------------------------------------------
--



   -------------



   



   DEPARTMENT OF PATHOLOGY, [address] [place] [address]



   Phone #[phone]   Fax #[phone]   [facility] Permit #[id number]



   010



   JOYCE Arias M.D. Assistant Dir



   marco antonio



   -----------------------------------------------------------------------------
--



   -------------

 

 38  NEGATIVE FOR CHLAMYDIA TRACHOMATIS rRNA



   



   A negative result does not preclude the presence of a



   C.trachomatis or N.gonorrhoeae infection because results are



   dependent on adequate specimen collection, absence of



   inhibitors, and sufficient rRNA to be detected.  Test



   results may be affected by improper specimen collection,



   improper specimen storage, technical error, or specimen



   mixup.

 

 39  NEGATIVE FOR NEISSERIA GONORRHOEAE rRNA



   



   A negative result does not preclude the presence of a



   C.trachomatis or N.gonorrhoeae infection because results are



   dependent on adequate specimen collection, absence of



   inhibitors, and sufficient rRNA to be detected.  Test



   results may be affected by improper specimen collection,



   improper specimen storage, technical error, or specimen



   mixup.

 

 40  Bacterial Vaginosis Not Likely



   None



   Many



   None

 

 41  CHOLESTEROL INTERPRETATION:



   Desirable:  Less than 200 MG/DL



   Borderline-High Risk:  200-239 MG/DL



   High-Risk:  240 MG/DL and over

 

 42  HDL INTERPRETATION:



   Undesirable: High Risk:  Less than 40 MG/DL



   Desirable:  Low Risk:  Greater than 60 MG/DL

 

 43  LDL INTERPRETATION:



   Low Risk Optimal Level:  LDL Less than 100 MG/DL



   Near or Above Optimal:  -129 MG/DL



   Borderline High Risk:  -159 MG/DL



   High Risk:  -189 MG/DL



   Very High Risk:  LDL Greater than 189 MG/DL







Procedures







 Date  CPT Code  Description  Status

 

 2015    Diabetic Retinal Eye Exam  Completed

 

 07/10/2014    Colonoscopy  Completed







Encounters







 Type  Date  Location  Provider  CPT E/M  Dx

 

 Office Visit  2017  [facility]  Ira Araujo M.D.  [address]  
Z00.00



   2:20p  - [place]      









 Z12.4

 

 Z23









 Office Visit  2016 11:40a  [facility]  Lalitah Kingston M.D.  
[address]  J02.9



     - [place]      

 

 Office Visit  2016  2:00p  [facility]  Lalitha Kingston M.D.  
[address]458  Z00.00



     - [place]      









 Z12.39

 

 K50.00

 

 G43.901









 Office Visit  2015  9:00a  [facility]  Lalitha Kingston M.D.  
01108  V76.2



     - [place]      









 V76.19

 

 V70.0

 

 346.92

 

 300.02

 

 V72.31

 

 346.90









 Office Visit  2014  2:00p  [facility]  Lalitha Kingston M.D.  
[address]  V70.0



     - [place]      









 V76.19

 

 V76.2

 

 300.02

 

 V04.81









 Office Visit  2012 10:00a  [facility]  Lalitha Kingston M.D.  
[address]  V76.2



     - [place]      









 V76.19

 

 691.8

 

 477.9

 

 V70.0









 Office Visit  2012 11:40a  [facility]  Lalitha Kingston M.D.  
[address]407  692.9



     - [place]      

 

 Office Visit  2011  1:00p  DO Not Use [facility]  Lalitha Kingston M.D.  [address]
  V76.2



     Plan Me Up      









 V76.10

 

 V04.81









 Office Visit  2011  2:00p  DO Not Use [facility]  Lalitha Kingston M.D.  [address]
  373.00



     Plan Me Up      









 V70.0

 

 V76.2







Plan of Care

2018 - Ira Araujo M.D.Z00.00 Encntr for general adult medical exam w/
o abnormal findingsComments:reviewed online questionnaire which reveals no 
diabetes, cholesterol profile is great !, normal BMI ,  Chris glad you have a 
healthy exercise routine and no evidence of anxiety /depression and stress 
levels are low You received the pneumonia vaccine yxybcW62.4 Encounter for 
screening for malignant neoplasm of [id number] Encounter for immunization

## 2018-05-09 NOTE — RAD
Indication: Fever. Sepsis.



Comparison: No relevant prior exams available on the [facility] [place] for comparison.



Technique: Upright AP 1820 hours



Report: Clear lungs and pleural spaces. Negative for pneumothorax. The heart, pulmonary

vasculature, and mediastinal contours are unremarkable. Negative for free air beneath the

diaphragm. Unremarkable osseous structures and soft tissue contours.



IMPRESSION: No etiology for fever evident. No evidence for acute intrathoracic disease.

## 2018-05-10 VITALS — DIASTOLIC BLOOD PRESSURE: 81 MMHG | SYSTOLIC BLOOD PRESSURE: 129 MMHG

## 2018-05-10 LAB
BASOPHILS # BLD AUTO: 0.1 10^3/UL (ref 0–0.2)
EOSINOPHIL # BLD AUTO: 0 10^3/UL (ref 0–0.6)
HCT VFR BLD AUTO: 30 % (ref 35–47)
HGB BLD-MCNC: 10.4 G/DL (ref 12–16)
LYMPHOCYTES # BLD AUTO: 2.4 10^3/UL (ref 1–4.8)
MCH RBC QN AUTO: 30 PG (ref 27–31)
MCHC RBC AUTO-ENTMCNC: 35 G/DL (ref 31–36)
MCV RBC AUTO: 86 FL (ref 80–97)
MONOCYTES # BLD AUTO: 1.3 10^3/UL (ref 0–0.8)
NEUTROPHILS # BLD AUTO: 10.3 10^3/UL (ref 1.5–7.7)
NRBC # BLD AUTO: 0 10^3/UL
NRBC BLD QL AUTO: 0
PLATELET # BLD AUTO: 240 10^3/UL (ref 150–450)
RBC # BLD AUTO: 3.53 10^6/UL (ref 4–5.4)
WBC # BLD AUTO: 14.1 10^3/UL (ref 3.5–10.8)

## 2018-05-10 RX ADMIN — POTASSIUM CHLORIDE SCH MLS/HR: 200 INJECTION, SOLUTION INTRAVENOUS at 13:46

## 2018-05-10 RX ADMIN — POTASSIUM CHLORIDE SCH MLS/HR: 200 INJECTION, SOLUTION INTRAVENOUS at 11:06

## 2018-05-10 RX ADMIN — POTASSIUM CHLORIDE SCH MLS/HR: 200 INJECTION, SOLUTION INTRAVENOUS at 12:24

## 2018-05-10 NOTE — CONS
CONSULTATION REPORT:

 

DATE OF CONSULT:  05/10/18

 

REQUESTING PROVIDER:  Dr. Rojas.

 

CONSULTING SERVICE:  Infectious Disease.

 

REASON FOR CONSULT:  Fever and headache.

 

IMPRESSION:

1.  Fever, headache, myalgia, erythematous rash, left arm.  The differential 
diagnosis includes a viral infection, including influenza ______ other viruses, 
less likely a bacterial infection, meningitis has been ruled out, she had a 
pneumococcal vaccine the day the symptoms started, so that is a consideration 
as a cause of allergic reaction, particularly given the erythematous rash over 
her left deltoid where she received the vaccine.  She does not spend lot of 
time outdoors, seems less likely to be tickborne infection.  She recently 
traveled to [place] but was not ill while there and no particular exposures or GI 
illness either.

2.  Crohn's disease, on Remicade.

3.  AMOXICILLIN allergy.

 

RECOMMENDATIONS:  Stop antibiotics since she feels much better this morning. 
Headaches nearly gone, no fevers overnight.  I am going to stop the antibiotics 
and we will follow her symptoms roughly.

 

HISTORY OF PRESENT ILLNESS:  This is a 38-year-old woman with Crohn's, on 
rituximab.  On Monday evening, developed fever, headaches, some neck stiffness, 
diffuse myalgias, malaise, chills, and her appetite was off.  She has had a 
pneumococcal vaccine that day and she noticed some pain at the injection site 
and little later, I think, Tuesday started to get some redness over her 
anterior deltoid.  Because of progressive fever, she came to the hospital 
yesterday, white count was 19,000.  She got IV fluids.  She got a dose of 
ceftriaxone.  She had a lumbar puncture, which showed no white cells, no red 
cells, glucose 70, protein 25, C-reactive protein is 300, BCG 0.  This morning, 
she feels much better.  Her headache has gone.  She has no neck stiffness.  She 
has eaten breakfast.  Her appetite is good.  Energy is much better.  Myalgias 
gone.  Has never had anything like this happen in the past.  She does still 
have a little bit of pain over her left deltoid with some anterior erythema, 
which is nontender, not warm that she can tell.  No trouble moving her left 
shoulder.

 

PAST MEDICAL HISTORY:

1.  Crohn's disease, on rituximab.

2.  Status post colectomy and ileostomy.

3.  Seasonal allergies.

4.  ALLERGIES to AMOXICILLIN, caused rash.

 

MEDICATIONS:

1.  Tylenol.

2.  Docusate.

3.  Zofran.

4.  Ceftriaxone.

 

FAMILY HISTORY:  No recurrent infections. Parents are both alive and healthy.

 

SOCIAL HISTORY:  She lives in [place].  She teaches at [employer], recent work trip 
to [facility] at the end of April for about a week.  She felt fine while 
she was there and after she got back.  No pets at home.  No sick contacts.

 

REVIEW OF SYSTEMS:  A 14-point review of systems all negative except as noted 
above.

 

PHYSICAL EXAM:  Vital Signs: Temperature is 36, heart rate 90, respiratory rate 
16, blood pressure 130/90, oxygen saturation 100% on room air.  General:  She 
is awake, and not in distress.  Neurologic:  She is oriented x3.  Follows all 
commands. HEENT:  There is no conjunctival hemorrhage.  Oropharynx without 
lesions.  Neck: Neck is supple without mass.  Lymph Nodes:  There is no cervical
, supraclavicular, inguinal, axillary, or epitrochlear lymphadenopathy.  Heart:
  Regular rate and rhythm without murmurs, rubs or gallops.  Lungs:  Clear to 
auscultation bilaterally.  Abdomen:  Soft, nontender, nondistended.  There are 
bowel sounds present. There is an ostomy bag with air and liquid stool.  Skin:  
Anterior left deltoid, there are faint, multiple erythematous plaques, which 
are blanching, nontender, not warm, not painful.  There is no other rash. 
DIAGNOSTIC STUDIES/LAB DATA:  White blood cell count 14, hemoglobin 10, 
platelets 240.  Creatinine 0.5.  Urinalysis showed blood and leukocyte 
esterase.  Culture is pending.

 

Please see the impressions and recommendations outlined above.

 

Thank you for asking me to see Ms. Romero in consultation.

 

 [id number] #: [id number]

LEXA

## 2018-05-10 NOTE — ED
nAa SHERIDAN Thomas, scribed for oLyd Duke MD on 05/09/18 at 1827 .





HPI Febrile Illness





- HPI Summary


HPI Summary: 





The patient is a 37 year old female complaining of fever, chills, frontal 

headache, fatigue, and body aches that began two days ago. She has been 

treating her headache with ibuprofen, acetaminophen, and aspirin. Her 

temperature at triage is 103.1. She had nausea and vomiting yesterday, but none 

since. She had a pneumonia vaccine three days ago. She complains of pain to the 

site of the injection. 





- History of Current Complaint


Chief Complaint: EDFever


Time Seen by Provider: 05/09/18 17:21


Hx Obtained From: Patient


Onset/Duration: Started Days Ago - 2, Still Present


Timing: Constant


Current Severity: Moderate


Pain Intensity: 7


Pain Scale Used: 0-10 Numeric


Aggravating Factors: Nothing


Alleviating Factors: OTC Medicine


Associated Signs and Symptoms: Other: - Fever, chills, frontal HA, nausea, 

vomiting, pain to site of injection





- Allergy/Home Medications


Allergies/Adverse Reactions: 


 Allergies











Allergy/AdvReac Type Severity Reaction Status Date / Time


 


amoxicillin Allergy Mild Rash Verified 05/09/18 16:52











Home Medications: 


 Home Medications





Beclomethasone Dipropionate [Qnasl] 80 mcg NA DAILY 05/09/18 [History Confirmed 

05/09/18]


Cetirizine* [ZyrTEC 10 MG TAB*] 10 mg PO DAILY PRN 05/09/18 [History Confirmed 

05/09/18]


Desogestrel-Ethinyl Estradiol [Juleber 0.15-30 mg-Mcg] 1 tab PO DAILY 05/09/18 [

History Confirmed 05/09/18]


Fexofenadine (NF) [Allegra 180 (NF)] 180 mg PO DAILY PRN 05/09/18 [History 

Confirmed 05/09/18]











PMH/Surg Hx/FS Hx/Imm Hx


GI History: Reports: Hx Crohn's Disease


Sensory History: Reports: Hx Contacts or Glasses - GLASSES


Opthamlomology History: Reports: Hx Contacts or Glasses - GLASSES





- Surgical History


Surgery Procedure, Year, and Place: ILEOSTOMY


Hx Anesthesia Reactions: No


Infectious Disease History: No


Infectious Disease History: Reports: Traveled Outside the [place] in Last 30 Days - 

[place]





- Family History


Known Family History: 


   Negative: Seizure Disorder





- Social History


Alcohol Use: Occasionally


Alcohol Amount: 1 drink weekly


Substance Use Type: Reports: None


Smoking Status (MU): Never Smoked Tobacco


Have You Smoked in the Last Year: No





Review of Systems


Positive: Fever, Chills, Fatigue


Positive: Vomiting, Nausea


Positive: Myalgia


Positive: Other - Pain to site of injection


Positive: Headache


All Other Systems Reviewed And Are Negative: Yes





Physical Exam





- Summary


Physical Exam Summary: 





Appearance: The patient is well-nourished in no acute distress and in no acute 

pain.


 


Skin: The skin is warm and dry and skin color reflects adequate perfusion. On 

the site of the injection, there is erythema, tenderness, and warmness to the 

touch. 


 


HEENT: The head is normocephalic and atraumatic. The pupils are equal and 

reactive. The conjunctivae are clear and without drainage. Nares are patent and 

without drainage. Mouth reveals moist mucous membranes and the throat is 

without erythema and exudate. The external ears are intact. The ear canals are 

patent and without drainage. The tympanic membranes are intact.


 


Neck: the neck is supple with full range of motion and non-tender. There are no 

carotid bruits. There is no neck vein distension.


 


Respiratory: Chest is non-tender. Lungs are clear to auscultation and breath 

sounds are symmetrical and equal.


 


Cardiovascular: Heart is regular rate and rhythm. There is no murmur or rub 

auscultated. There is no peripheral edema and pulses are symmetrical and equal.


 


Abdomen: The abdomen is soft and non-tender. There are normal bowel sounds 

heard in all four quadrants and there is no organomegaly palpated.


 


Musculoskeletal: There is no back tenderness noted. Extremities are non-tender 

with full range of motion. There is good capillary refill. There is no 

peripheral edema or calf tenderness elicited.


 


Neurological: Patient is alert and oriented to person, place and time. The 

patient has symmetrical motor strength in all four extremities. Cranial nerves 

are grossly intact. Deep tendon reflexes are symmetrical and equal in all four 

extremities.


 


Psychiatric: The patient has an appropriate affect and does not exhibit any 

anxiety or depression.


Triage Information Reviewed: Yes


Vital Signs On Initial Exam: 


 Initial Vitals











Temp Pulse Resp BP Pulse Ox


 


 103.1 F   121   20   130/75   97 


 


 05/09/18 16:48  05/09/18 16:48  05/09/18 16:48  05/09/18 16:48  05/09/18 16:48











Vital Signs Reviewed: Yes





Procedures





- Lumbar Puncture


Position: Sitting


Aseptic Technique: Lidocaine


Anesthesia Used: 2.0% Lido


Spinal Needle Used: 22 Gauge


Lumbar Puncture Note: 





Unable to obtain CSF with initial attempts.  The patient was re-anesthetized 

one segment higher and CSF was obtained fairly readily. She tolerated the 

procedure very well with no C/O.  The fluid was grossly clear and came with 

normal flow.  The fluid kari in the column to 24 cms which was the level of her 

head. So I judged the opening pressure as normal.





Diagnostics





- Vital Signs


 Vital Signs











  Temp Pulse Resp BP Pulse Ox


 


 05/09/18 18:09  103.9 F    


 


 05/09/18 18:00   105    96


 


 05/09/18 17:07   108    99


 


 05/09/18 16:48  103.1 F  121  20  130/75  97














- Laboratory


Lab Results: 


 Lab Results











  05/09/18 05/09/18 05/09/18 Range/Units





  18:10 18:10 18:10 


 


WBC  19.2 H    (3.5-10.8)  10^3/ul


 


RBC  4.24    (4.0-5.4)  10^6/ul


 


Hgb  12.3    (12.0-16.0)  g/dl


 


Hct  37    (35-47)  %


 


MCV  86    (80-97)  fL


 


MCH  29    (27-31)  pg


 


MCHC  34    (31-36)  g/dl


 


RDW  12    (10.5-15)  %


 


Plt Count  269    (150-450)  10^3/ul


 


MPV  7.6    (7.4-10.4)  um3


 


Neut % (Auto)  85.9 H    (38-83)  %


 


Lymph % (Auto)  7.5 L    (25-47)  %


 


Mono % (Auto)  6.3    (0-7)  %


 


Eos % (Auto)  0    (0-6)  %


 


Baso % (Auto)  0.3    (0-2)  %


 


Absolute Neuts (auto)  16.5 H    (1.5-7.7)  10^3/ul


 


Absolute Lymphs (auto)  1.4    (1.0-4.8)  10^3/ul


 


Absolute Monos (auto)  1.2 H    (0-0.8)  10^3/ul


 


Absolute Eos (auto)  0    (0-0.6)  10^3/ul


 


Absolute Basos (auto)  0.1    (0-0.2)  10^3/ul


 


Absolute Nucleated RBC  0    10^3/ul


 


Nucleated RBC %  0.1    


 


ESR  52 H    (0-14)  mm/Hr


 


INR (Anticoag Therapy)   1.37 H   (0.77-1.02)  


 


Sodium    126 L  (139-145)  mmol/L


 


Potassium    3.3 L  (3.5-5.0)  mmol/L


 


Chloride    95 L  (101-111)  mmol/L


 


Carbon Dioxide    20 L  (22-32)  mmol/L


 


Anion Gap    11  (2-11)  mmol/L


 


BUN    6  (6-24)  mg/dL


 


Creatinine    0.71  (0.51-0.95)  mg/dL


 


Est GFR ( Amer)    117.9  (>60)  


 


Est GFR (Non-Af Amer)    91.6  (>60)  


 


BUN/Creatinine Ratio    8.5  (8-20)  


 


Glucose    110 H  ()  mg/dL


 


Lactic Acid     (0.5-2.0)  mmol/L


 


Calcium    8.8  (8.6-10.3)  mg/dL


 


Total Bilirubin    0.60  (0.2-1.0)  mg/dL


 


AST    15  (13-39)  U/L


 


ALT    9  (7-52)  U/L


 


Alkaline Phosphatase    52  ()  U/L


 


Troponin I    0.00  (<0.04)  ng/mL


 


C-Reactive Protein    302.98 H  (< 5.00)  mg/L


 


Total Protein    7.2  (6.4-8.9)  g/dL


 


Albumin    3.6  (3.2-5.2)  g/dL


 


Globulin    3.6  (2-4)  g/dL


 


Albumin/Globulin Ratio    1.0  (1-3)  


 


Beta HCG, Quant    < 0.60  mIU/mL


 


Urine Color     


 


Urine Appearance     


 


Urine pH     (5-9)  


 


Ur Specific Gravity     (1.010-1.030)  


 


Urine Protein     (Negative)  


 


Urine Ketones     (Negative)  


 


Urine Blood     (Negative)  


 


Urine Nitrate     (Negative)  


 


Urine Bilirubin     (Negative)  


 


Urine Urobilinogen     (Negative)  


 


Ur Leukocyte Esterase     (Negative)  


 


Urine WBC (Auto)     (Absent)  


 


Urine RBC (Auto)     (Absent)  


 


Ur Squamous Epith Cells     (Absent)  


 


Urine Bacteria     (Absent)  


 


Urine Glucose     (Negative)  














  05/09/18 05/09/18 Range/Units





  18:10 20:18 


 


WBC    (3.5-10.8)  10^3/ul


 


RBC    (4.0-5.4)  10^6/ul


 


Hgb    (12.0-16.0)  g/dl


 


Hct    (35-47)  %


 


MCV    (80-97)  fL


 


MCH    (27-31)  pg


 


MCHC    (31-36)  g/dl


 


RDW    (10.5-15)  %


 


Plt Count    (150-450)  10^3/ul


 


MPV    (7.4-10.4)  um3


 


Neut % (Auto)    (38-83)  %


 


Lymph % (Auto)    (25-47)  %


 


Mono % (Auto)    (0-7)  %


 


Eos % (Auto)    (0-6)  %


 


Baso % (Auto)    (0-2)  %


 


Absolute Neuts (auto)    (1.5-7.7)  10^3/ul


 


Absolute Lymphs (auto)    (1.0-4.8)  10^3/ul


 


Absolute Monos (auto)    (0-0.8)  10^3/ul


 


Absolute Eos (auto)    (0-0.6)  10^3/ul


 


Absolute Basos (auto)    (0-0.2)  10^3/ul


 


Absolute Nucleated RBC    10^3/ul


 


Nucleated RBC %    


 


ESR    (0-14)  mm/Hr


 


INR (Anticoag Therapy)    (0.77-1.02)  


 


Sodium    (139-145)  mmol/L


 


Potassium    (3.5-5.0)  mmol/L


 


Chloride    (101-111)  mmol/L


 


Carbon Dioxide    (22-32)  mmol/L


 


Anion Gap    (2-11)  mmol/L


 


BUN    (6-24)  mg/dL


 


Creatinine    (0.51-0.95)  mg/dL


 


Est GFR ( Amer)    (>60)  


 


Est GFR (Non-Af Amer)    (>60)  


 


BUN/Creatinine Ratio    (8-20)  


 


Glucose    ()  mg/dL


 


Lactic Acid  0.8   (0.5-2.0)  mmol/L


 


Calcium    (8.6-10.3)  mg/dL


 


Total Bilirubin    (0.2-1.0)  mg/dL


 


AST    (13-39)  U/L


 


ALT    (7-52)  U/L


 


Alkaline Phosphatase    ()  U/L


 


Troponin I    (<0.04)  ng/mL


 


C-Reactive Protein    (< 5.00)  mg/L


 


Total Protein    (6.4-8.9)  g/dL


 


Albumin    (3.2-5.2)  g/dL


 


Globulin    (2-4)  g/dL


 


Albumin/Globulin Ratio    (1-3)  


 


Beta HCG, Quant    mIU/mL


 


Urine Color   Straw  


 


Urine Appearance   Clear  


 


Urine pH   6.0  (5-9)  


 


Ur Specific Gravity   1.001 L  (1.010-1.030)  


 


Urine Protein   Negative  (Negative)  


 


Urine Ketones   Trace A  (Negative)  


 


Urine Blood   3+ A  (Negative)  


 


Urine Nitrate   Negative  (Negative)  


 


Urine Bilirubin   Negative  (Negative)  


 


Urine Urobilinogen   Negative  (Negative)  


 


Ur Leukocyte Esterase   3+ A  (Negative)  


 


Urine WBC (Auto)   1+(6-10/hpf) A  (Absent)  


 


Urine RBC (Auto)   Trace(0-2/hpf)  (Absent)  


 


Ur Squamous Epith Cells   Present A  (Absent)  


 


Urine Bacteria   1+ A  (Absent)  


 


Urine Glucose   Negative  (Negative)  











Result Diagrams: 


 05/10/18 07:15





 05/10/18 07:14


Lab Statement: Any lab studies that have been ordered have been reviewed, and 

results considered in the medical decision making process.





- Radiology


  ** CXR


Xray Interpretation: No Acute Changes - IMPRESSION: No etiology for fever 

evident. No evidence for acute intrathoracic disease. Dr. Duke has reviewed 

this report.


Radiology Interpretation Completed By: Radiologist





Course/Dx





- Course


Course Of Treatment: Ms. Romero presented meeting sepsis criteria.  She did not 

look toxic but was febrile and tachycardic.  She had no meningismus with 

negative kernig and brudzinski signs. She was very tender with surrounding 

erythema at the injection site on her left shoulder. She was given fluids and 

vancomycin while labs were obtained and the hospitalist service was consulted 

for admission.





- Diagnoses


Provider Diagnoses: 


 Sepsis








- Provider Notifications


Discussed Care Of Patient With: Carlos Enrique Maciel


Time Discussed With Above Provider: 20:09


Instructed by Provider To: Admit As Inpatient





- Critical Care Time


Critical Care Time: 30-74 min





Discharge





- Sign-Out/Discharge


Documenting (check all that apply): Discharge/Admit/Transfer





- Discharge Plan


Condition: Fair


Disposition: ADMITTED TO [facility]





- Billing Disposition and Condition


Condition: FAIR


Disposition: [facility]





The documentation as recorded by the Ana myrick Thomas accurately reflects 

the service I personally performed and the decisions made by Srikanth bonilla Richard L, MD.

## 2018-05-10 NOTE — HP
CC:  Ira Araujo MD *

 

HISTORY AND PHYSICAL:

 

DATE OF ADMISSION:  05/09/18

 

PRIMARY CARE PHYSICIAN:  Ira Araujo MD

 

TIME OF EVALUATION:  2000

 

CHIEF COMPLAINT:  Fever and headache.

 

HISTORY OF PRESENT ILLNESS:  This is a 37-year-old female with a past medical 
history of Crohn's disease status post ileostomy, on Remicade, who presented to 
the emergency after calling her primary care physician office for persistent 
fevers. The patient states she went to see her primary care physician.  She got 
the Pneumovax vaccine on Monday, 05/07/18.  That evening, she developed fevers, 
chills, and a frontal headache.  Her temperature has been going off and on for 
the past 3 days.  It is high as 104.  She said she had nausea, vomiting 
yesterday.  She has had decrease in appetite and decrease in output of her 
ostomy bag.  She denies any abdominal pain.  No urinary symptoms.  No dysuria.  
No rashes noted.  No URI symptoms.  No sore throat.  No sick contacts.  No 
weight changes.  No chest pain. She states she was anxious earlier and thought 
she was having some shortness of breath.  She states her neck has been stiff as 
well, but she thinks it may be from lying around.  She does have some pain in 
her knee joints bilaterally.  In the emergency room, the patient had labs, 
imaging.  She was given 2 L of normal saline, Tylenol, and vancomycin.  
Referred to the hospitalist service for further evaluation.

 

PAST MEDICAL HISTORY:

1.  Crohn's disease status post ileostomy, followed by Dr. Rutherford.

2.  Seasonal allergies.

 

MEDICATIONS:

1.  Desogestrel and ethinyl 1 tab p.o. daily.

2.  Beclomethasone dipropionate 80 mcg daily.

3.  Remicade 300 mg IV every 8 weeks, is scheduled to get her Remicade this 
Friday, 05/11/18.

4.  Allegra 180 mg p.o. daily as needed.

5.  Zyrtec 10 mg p.o. daily as needed.

 

ALLERGIES:  AMOXICILLIN develops rash.

 

FAMILY HISTORY:  Parents are alive and healthy.

 

SOCIAL HISTORY:  She lives at home with her .  She teaches at [employer], 
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  Her , Bert Blackwood is her healthcare proxy.  No 
history of smoking.  Occasional alcohol.  No illicit drug use.  Code status is 
full code.

 

REVIEW OF SYSTEMS:  A 14-point review of systems as mentioned in the HPI.  In 
addition, the patient did travel to [place] in late April.  Otherwise negative.

 

                               PHYSICAL EXAMINATION

 

GENERAL:  In no acute distress.  Resting comfortably.

 

VITAL SIGNS:  T-max 103.9, pulse rate 105, respiratory rate 16, oxygen 
saturation 98% on room air, blood pressure 134/86.

 

HEENT:  Head:  Normocephalic.  Pupils are equal and reactive. Oropharynx:  
Mucous membranes are moist.

 

NECK:  She does have some adenopathy, more prominent on the right side.  She 
does have some neck tenderness with flexion and frontal head tenderness as well 
concerning for a possibility of nuchal rigidity.

 

RESPIRATORY:  Clear to auscultation.  No wheezing, rhonchi, or rales.

 

CARDIAC:  Tachycardia.  No murmurs, rubs, or gallops.

 

ABDOMEN:  Positive bowel sounds.  Soft, nontender, and nondistended.  Ostomy is 
output of brown stool.

 

EXTREMITIES:  No clubbing, cyanosis, or edema.  +2 DPs.

 

NEUROLOGIC:  Alert and oriented x3.  No gross focal neurologic deficits.

 

DERM:  The patient with 3 to 4 cm erythematous patches on her left upper 
extremity. No tenderness, itching, or swelling.

 

 DIAGNOSTIC STUDIES/LAB DATA:  White count 19.2, hemoglobin 12.3, hematocrit 37
, platelets 269.  INR 1.37.  Sodium 126, potassium 3.3, chloride 95, bicarb 20, 
BUN 6, creatinine 0.71, glucose 110.  .  Beta hCG less than 6.  
Urinalysis shows +3 blood, +1 white cells, +3 leukocytes.

 

RADIOGRAPHIC DATA:  No etiology for fever.  No evidence for acute intrathoracic 
disease.

 

ASSESSMENT AND PLAN:  This is a 37-year-old female with a past medical history 
of Crohn's, on Remicade, who presented to the emergency room after having 3 
days of fever and headache in the setting of a recent Pneumovax vaccine on 05/07
/18.

 

1.  Fever and headache.  

Assessment:  The patient is nontoxic appearing, although she has several 
laboratory derangements and a high temp.  It is possible this is a reaction to 
her vaccine causing serum sickness; however, with her neck stiffness and 
headache, I think we need to rule out meningitis in an immunosuppressed 
patient. 



Plan:  We will ask the emergency room to do a lumbar puncture.  We will give 
her 2 g of ceftriaxone now and follow up LP results.  For now, continue with IV 
fluids, pain control.  Again, follow up on her cultures.  She does have pyuria; 
however, she has no urinary symptoms and if she does have a UTI, her 
ceftriaxone will cover her.  Consider Infectious Disease consultation in the 
morning.



2.  Chronic medical problems.  

Crohn's disease.  The patient is only on Remicade and no active flare-up at 
this time.



Fluid, electrolytes, and nutrition.  Place the patient on a regular diet.



DVT prophylaxis.  The patient scores low risk.  We will encourage ambulation.



Code status.  Full code.

 

PATIENT TIME:  Greater than 60 minutes was spent doing history and physical, 
greater than half the time was in direct patient contact.

 

[id number]/CPS #: [id number]

MTDD

## 2018-05-11 NOTE — DS
CC:  Dr. Araujo *

 

DISCHARGE SUMMARY:

 

DATE OF ADMISSION:  05/09/18

 

DATE OF DISCHARGE:  05/10/18

 

PRIMARY CARE PROVIDER:  Dr. Araujo.

 

DISCHARGE DIAGNOSIS:  Fever, headache, secondary to vaccine reaction.

 

SECONDARY DIAGNOSES:

1.  Seasonal allergies.

2.  Crohn's disease, status post ileostomy, on regular Remicade infusions every 
8 weeks.

 

MEDICATION LIST:

1.  Acetaminophen 650 mg p.o. q.4 hours p.r.n. pain or fever.

2.  Beclomethasone dipropionate 80 mcg nasal daily.

3.  Cetirizine 10 mg p.o. daily as needed for allergies.

4.  Desogestrel and ethinyl estradiol 1 tablet p.o. daily.

5.  Fexofenadine 180 mg p.o. daily as needed for allergy symptoms.

6.  Remicade 300 mg IV every 8 weeks.

 

HOSPITAL COURSE:  Ms. Romero is a 37-year-old lady with a past medical history as 
stated above that presented to the emergency room with complaints of fever and 
headache.  On 05/07/18, the patient received Pneumovax vaccine and that evening 
she developed fever, chills and a frontal headache.  She continued to have 
intermittent fevers for the past 3 days as high as 104.  For more details about 
her presentation, I refer you to her history and physical.  As the patient had 
complained of neck stiffness, there was also concern for possible meningitis.

 

Her workup in the emergency room included a CBC that showed a WBC of 19.2 with 
85% neutrophils.  A sed rate of 52, a CRP of 302.  Chest x-ray showed no 
evidence for acute intrathoracic disease.

 

The patient was admitted for further evaluation.  She had an LP performed in 
the emergency room and her CSF showed no cells.  Her urinalysis was abnormal 
but her urine culture grew only normal jeffrey.  There was no evident source of 
infection.

 

She was also seen in consultation by Infectious Disease (Dr. Rosado) and his 
impression was the patient's seizure was likely secondary to her reaction to 
vaccination and no antibiotics were recommended.

 

The patient was already feeling improved.  She had no further fever after 
admission and she felt well enough for discharge.

 

PHYSICAL EXAMINATION:  Vital Signs:  Temperature 98.3, heart rate is 73, 
respiratory rate is 15, oxygen saturation 99% on room air, blood pressure is 129
/81.  General:  Patient is a pleasant young lady, lying in bed, in no acute 
distress.  CVS:  Normal S1, S2  regular rate and rhythm.  Chest:  Breath sounds 
bilaterally with no added sounds.  Abdomen is soft, nontender with working 
ostomy. Extremities:  The patient has patchy erythema to her left deltoid area 
but no fluctuation, no significant tenderness or itching in the area.  It 
should be patchy erythema, edema, and warmth.  Neuro:  She is alert and 
oriented x3.  She moves all 4 extremities.

 

DIET:  Regular diet.

 

ACTIVITIES:  As tolerated.

 

DISPOSITION:  To home.

 

STATUS WHILE IN THE HOSPITAL:  Observation.

 

Please keep in mind this is a summarized version of this patient's hospital 
stay. If you need more information, please feel free to call me at [phone] 
or please obtain the full medical records.

 

TIME SPENT:  Approximately 40 minutes were spent to complete this discharge.

 

 [id number]/CPS #: [id number]

MTDD